# Patient Record
Sex: FEMALE | Race: OTHER | HISPANIC OR LATINO | Employment: FULL TIME | URBAN - METROPOLITAN AREA
[De-identification: names, ages, dates, MRNs, and addresses within clinical notes are randomized per-mention and may not be internally consistent; named-entity substitution may affect disease eponyms.]

---

## 2023-06-16 ENCOUNTER — OFFICE VISIT (OUTPATIENT)
Dept: FAMILY MEDICINE CLINIC | Facility: CLINIC | Age: 28
End: 2023-06-16
Payer: COMMERCIAL

## 2023-06-16 VITALS
BODY MASS INDEX: 32.32 KG/M2 | SYSTOLIC BLOOD PRESSURE: 122 MMHG | RESPIRATION RATE: 16 BRPM | DIASTOLIC BLOOD PRESSURE: 80 MMHG | TEMPERATURE: 97 F | HEIGHT: 65 IN | WEIGHT: 194 LBS | OXYGEN SATURATION: 99 % | HEART RATE: 98 BPM

## 2023-06-16 DIAGNOSIS — E66.09 CLASS 1 OBESITY DUE TO EXCESS CALORIES WITHOUT SERIOUS COMORBIDITY WITH BODY MASS INDEX (BMI) OF 32.0 TO 32.9 IN ADULT: ICD-10-CM

## 2023-06-16 DIAGNOSIS — E28.2 POLYCYSTIC OVARY: ICD-10-CM

## 2023-06-16 DIAGNOSIS — E01.0 THYROMEGALY: ICD-10-CM

## 2023-06-16 DIAGNOSIS — Z11.59 NEED FOR HEPATITIS C SCREENING TEST: ICD-10-CM

## 2023-06-16 DIAGNOSIS — Z00.00 ANNUAL PHYSICAL EXAM: Primary | ICD-10-CM

## 2023-06-16 DIAGNOSIS — I83.813 VARICOSE VEINS OF BOTH LOWER EXTREMITIES WITH PAIN: ICD-10-CM

## 2023-06-16 DIAGNOSIS — R94.6 ABNORMAL THYROID EXAM: ICD-10-CM

## 2023-06-16 DIAGNOSIS — J06.9 ACUTE URI: ICD-10-CM

## 2023-06-16 PROBLEM — I83.93 VARICOSE VEINS OF BOTH LOWER EXTREMITIES: Status: ACTIVE | Noted: 2023-06-16

## 2023-06-16 LAB
S PYO AG THROAT QL: NEGATIVE
SARS-COV-2 AG UPPER RESP QL IA: NEGATIVE
VALID CONTROL: NORMAL

## 2023-06-16 PROCEDURE — 99214 OFFICE O/P EST MOD 30 MIN: CPT | Performed by: FAMILY MEDICINE

## 2023-06-16 PROCEDURE — 87811 SARS-COV-2 COVID19 W/OPTIC: CPT | Performed by: FAMILY MEDICINE

## 2023-06-16 PROCEDURE — 87880 STREP A ASSAY W/OPTIC: CPT | Performed by: FAMILY MEDICINE

## 2023-06-16 PROCEDURE — 99385 PREV VISIT NEW AGE 18-39: CPT | Performed by: FAMILY MEDICINE

## 2023-06-16 RX ORDER — AMOXICILLIN 875 MG/1
875 TABLET, COATED ORAL 2 TIMES DAILY
Qty: 20 TABLET | Refills: 0 | Status: SHIPPED | OUTPATIENT
Start: 2023-06-16 | End: 2023-06-26

## 2023-06-16 RX ORDER — FLUOXETINE HYDROCHLORIDE 20 MG/1
CAPSULE ORAL
COMMUNITY
Start: 2023-05-10 | End: 2023-06-16

## 2023-06-16 RX ORDER — CYCLOBENZAPRINE HCL 5 MG
TABLET ORAL
COMMUNITY
Start: 2023-05-10 | End: 2023-06-16

## 2023-06-16 RX ORDER — HYDROXYZINE HYDROCHLORIDE 25 MG/1
25 TABLET, FILM COATED ORAL 3 TIMES DAILY
COMMUNITY
Start: 2023-05-10 | End: 2023-06-16

## 2023-06-16 NOTE — PROGRESS NOTES
Name: Elda Macdonald      : 1995      MRN: [id number]  Encounter Provider: Karina Romero MD  Encounter Date: 2023   Encounter department: [address] [place] [address] MEDICINE    Assessment & Plan     1  Annual physical exam  Assessment & Plan:  Check routine labs had cmp and lipids all nl (pt shoed results on cell phone)      Orders:  -     CBC and differential; Future    2  Acute URI  Assessment & Plan:  7 days of upper respiratory symptoms and pnaryngisit check covid / strep    Orders:  -     POCT Rapid Covid Ag  -     POCT rapid strepA  -     amoxicillin (AMOXIL) 875 mg tablet; Take 1 tablet (875 mg total) by mouth 2 (two) times a day for 10 days    3  Varicose veins of both lower extremities with pain  Assessment & Plan:  Compression stockings     Orders:  -     Ambulatory Referral to Vascular Surgery; Future    4  Need for hepatitis C screening test  -     Hepatitis C Antibody; Future    5  Class 1 obesity due to excess calories without serious comorbidity with body mass index (BMI) of 32 0 to 32 9 in adult  Assessment & Plan:  Discussion on diet and exercise guidelines for weight loss and  health reviewed with pt         6  Polycystic ovary  Assessment & Plan:  Check insulin level send to gyn    Orders:  -     Insulin, fasting; Future    7  Abnormal thyroid exam  Assessment & Plan:  Pt told  She had abnormal labs     Orders:  -     T4, free; Future  -     TSH, 3rd generation; Future    8  Thyromegaly  Assessment & Plan: Will check TSH      Orders:  -     US thyroid; Future; Expected date: 2023           Subjective      Cough  This is a new problem  The current episode started in the past 7 days  The problem has been gradually improving  The problem occurs hourly  The cough is non-productive  Associated symptoms include ear congestion, nasal congestion, rhinorrhea and a sore throat   Pertinent negatives include no chest pain, chills, ear pain, fever, headaches, heartburn, hemoptysis, myalgias, postnasal drip, rash, shortness of breath, sweats, weight loss or wheezing  The symptoms are aggravated by dust     new pt her for physical has been having sore throat cough and congestion for 7 days no fever son ill as well pt also complains of varicose veins in legs  Pt told he was in past she had hyperthyroidism but has been gaining weight pt also told she had high  Insulin level pt has IUD pt otold she has polycystic ovaries  Pt had labs done by insurance company     Review of Systems   Constitutional: Negative for appetite change, chills, fatigue, fever and weight loss  HENT: Positive for rhinorrhea and sore throat  Negative for congestion, ear pain, mouth sores, postnasal drip, sinus pressure, sinus pain and trouble swallowing  Eyes: Negative for discharge  Respiratory: Positive for cough  Negative for hemoptysis, chest tightness, shortness of breath and wheezing  Cardiovascular: Negative for chest pain, palpitations and leg swelling  Gastrointestinal: Negative for abdominal pain, constipation, diarrhea, heartburn, nausea and vomiting  Genitourinary: Negative for difficulty urinating and frequency  Musculoskeletal: Negative for arthralgias, back pain, gait problem, myalgias and neck pain  Worried about varicose veins states they are painful   Skin: Negative for rash  Neurological: Negative for dizziness, weakness, light-headedness, numbness and headaches  Hematological: Does not bruise/bleed easily  Psychiatric/Behavioral: Negative for dysphoric mood and sleep disturbance  The patient is not nervous/anxious          Current Outpatient Medications on File Prior to Visit   Medication Sig   • [DISCONTINUED] cyclobenzaprine (FLEXERIL) 5 mg tablet KAROLINE TABLETA DOS VECES AL JOSH ITZEL SEA NECESSARIO PARA EL DOLOR X 7 DOMINGUEZ (Patient not taking: Reported on 6/16/2023)   • [DISCONTINUED] FLUoxetine (PROzac) 20 mg capsule TOME KAROLINE C PSULA TODOS LOS D AS EN LA LYUDMILA CALLOWAY FOR 90 DAYS "(Patient not taking: Reported on 6/16/2023)   • [DISCONTINUED] hydrOXYzine HCL (ATARAX) 25 mg tablet Take 25 mg by mouth 3 (three) times a day (Patient not taking: Reported on 6/16/2023)       Objective     /80 (BP Location: Left arm, Patient Position: Sitting, Cuff Size: Standard)   Pulse 98   Temp (!) 97 °F (36 1 °C) (Tympanic)   Resp 16   Ht 5' 5\" (1 651 m)   Wt 88 kg (194 lb)   SpO2 99%   BMI 32 28 kg/m²     Physical Exam  Vitals reviewed  Constitutional:       General: She is not in acute distress  Appearance: Normal appearance  She is well-developed  She is not ill-appearing  HENT:      Head: Normocephalic  Right Ear: Tympanic membrane, ear canal and external ear normal       Left Ear: Tympanic membrane, ear canal and external ear normal       Nose: Nose normal       Mouth/Throat:      Mouth: Mucous membranes are moist       Pharynx: Posterior oropharyngeal erythema present  No oropharyngeal exudate  Eyes:      General: Lids are normal  No scleral icterus  Extraocular Movements: Extraocular movements intact  Conjunctiva/sclera: Conjunctivae normal       Pupils: Pupils are equal, round, and reactive to light  Neck:      Thyroid: Thyromegaly present  Vascular: No carotid bruit  Cardiovascular:      Rate and Rhythm: Normal rate and regular rhythm  Pulses: Normal pulses  Heart sounds: Normal heart sounds  No murmur heard  No friction rub  Pulmonary:      Effort: Pulmonary effort is normal       Breath sounds: Normal breath sounds  No wheezing, rhonchi or rales  Abdominal:      General: Bowel sounds are normal  There is no distension  Palpations: Abdomen is soft  There is no mass  Tenderness: There is no abdominal tenderness  There is no guarding  Hernia: No hernia is present  Musculoskeletal:         General: Normal range of motion  Cervical back: Normal range of motion and neck supple     Lymphadenopathy:      Cervical: No " cervical adenopathy  Skin:     General: Skin is warm and dry  Findings: No rash  Comments: No abnormal appearing moles varicose veins   Neurological:      General: No focal deficit present  Mental Status: She is alert and oriented to person, place, and time  Mental status is at baseline  Cranial Nerves: No cranial nerve deficit  Sensory: No sensory deficit  Motor: No weakness, tremor or abnormal muscle tone  Coordination: Coordination normal       Gait: Gait normal       Deep Tendon Reflexes: Reflexes normal    Psychiatric:         Mood and Affect: Mood normal          Speech: Speech normal          Behavior: Behavior normal      BMI Counseling: Body mass index is 32 28 kg/m²  The BMI is above normal  Nutrition recommendations include 3-5 servings of fruits/vegetables daily, reducing fast food intake, consuming healthier snacks, decreasing soda and/or juice intake, moderation in carbohydrate intake, increasing intake of lean protein and reducing intake of saturated fat and trans fat  Exercise recommendations include exercising 3-5 times per week and strength training exercises    José Miguel Duvall MD

## 2023-07-18 ENCOUNTER — TELEPHONE (OUTPATIENT)
Dept: VASCULAR SURGERY | Facility: CLINIC | Age: 28
End: 2023-07-18

## 2023-07-18 NOTE — TELEPHONE ENCOUNTER
The number on file is out of service     From: Ana Cristina Barone <[email or web address]   Sent: Tuesday, July 18, 2023 9:28 AM  To: Donnie Nieto <[email or web address]; Viky Velasquez <[email or web address]; Jenn Marie <[email or web address]  Subject: FW: [EXTERNAL] [facility] Make an Appointment          From: [email or web address]   Sent: Friday, July 14, 2023 11:16 PM  To: Sebastian Singh <Sandy [email or web address]; Stephanie Muniz <[email or web address]; Alma Head <[email or web address]; Estella Sicard <Chris [email or web address]; Anahy Aguillon <[email or web address]; [email or web address]; Skyler Marcelo <[email or web address]  Subject: [EXTERNAL] [facility] Make an Appointment    WARNING! Based upon the critical issue with ransomware targeting Healthcare systems ALL attachments and links from this email should be heavily scrutinized. First Name: Catalina Tirado   Last Name: Ankita Garcia  YOB: 1995  Email: [email or web address]  Phone: [phone]   Address: [address] [place] [address]: [place]: [place]  Zip: [address]  Service Area: Vascular  Comments: